# Patient Record
Sex: MALE | Race: ASIAN | NOT HISPANIC OR LATINO
[De-identification: names, ages, dates, MRNs, and addresses within clinical notes are randomized per-mention and may not be internally consistent; named-entity substitution may affect disease eponyms.]

---

## 2020-05-07 ENCOUNTER — TRANSCRIPTION ENCOUNTER (OUTPATIENT)
Age: 25
End: 2020-05-07

## 2021-04-01 ENCOUNTER — TRANSCRIPTION ENCOUNTER (OUTPATIENT)
Age: 26
End: 2021-04-01

## 2021-09-10 ENCOUNTER — TRANSCRIPTION ENCOUNTER (OUTPATIENT)
Age: 26
End: 2021-09-10

## 2021-12-20 ENCOUNTER — TRANSCRIPTION ENCOUNTER (OUTPATIENT)
Age: 26
End: 2021-12-20

## 2022-02-17 ENCOUNTER — TRANSCRIPTION ENCOUNTER (OUTPATIENT)
Age: 27
End: 2022-02-17

## 2024-06-11 ENCOUNTER — EMERGENCY (EMERGENCY)
Facility: HOSPITAL | Age: 29
LOS: 1 days | Discharge: HOME | End: 2024-06-11
Attending: STUDENT IN AN ORGANIZED HEALTH CARE EDUCATION/TRAINING PROGRAM | Admitting: EMERGENCY MEDICINE
Payer: MEDICAID

## 2024-06-11 VITALS — HEART RATE: 155 BPM | OXYGEN SATURATION: 97 %

## 2024-06-11 LAB
BASOPHILS # BLD AUTO: 0.04 K/UL — SIGNIFICANT CHANGE UP (ref 0–0.2)
BASOPHILS NFR BLD AUTO: 0.7 % — SIGNIFICANT CHANGE UP (ref 0–2)
EOSINOPHIL # BLD AUTO: 0.03 K/UL — SIGNIFICANT CHANGE UP (ref 0–0.5)
EOSINOPHIL NFR BLD AUTO: 0.5 % — SIGNIFICANT CHANGE UP (ref 0–6)
HCT VFR BLD CALC: 37.3 % — LOW (ref 39–50)
HGB BLD-MCNC: 13.2 G/DL — SIGNIFICANT CHANGE UP (ref 13–17)
IANC: 3.41 K/UL — SIGNIFICANT CHANGE UP (ref 1.8–7.4)
IMM GRANULOCYTES NFR BLD AUTO: 0.2 % — SIGNIFICANT CHANGE UP (ref 0–0.9)
LYMPHOCYTES # BLD AUTO: 1.96 K/UL — SIGNIFICANT CHANGE UP (ref 1–3.3)
LYMPHOCYTES # BLD AUTO: 32.3 % — SIGNIFICANT CHANGE UP (ref 13–44)
MCHC RBC-ENTMCNC: 30.5 PG — SIGNIFICANT CHANGE UP (ref 27–34)
MCHC RBC-ENTMCNC: 35.4 GM/DL — SIGNIFICANT CHANGE UP (ref 32–36)
MCV RBC AUTO: 86.1 FL — SIGNIFICANT CHANGE UP (ref 80–100)
MONOCYTES # BLD AUTO: 0.62 K/UL — SIGNIFICANT CHANGE UP (ref 0–0.9)
MONOCYTES NFR BLD AUTO: 10.2 % — SIGNIFICANT CHANGE UP (ref 2–14)
NEUTROPHILS # BLD AUTO: 3.41 K/UL — SIGNIFICANT CHANGE UP (ref 1.8–7.4)
NEUTROPHILS NFR BLD AUTO: 56.1 % — SIGNIFICANT CHANGE UP (ref 43–77)
NRBC # BLD: 0 /100 WBCS — SIGNIFICANT CHANGE UP (ref 0–0)
NRBC # FLD: 0 K/UL — SIGNIFICANT CHANGE UP (ref 0–0)
PLATELET # BLD AUTO: 316 K/UL — SIGNIFICANT CHANGE UP (ref 150–400)
RBC # BLD: 4.33 M/UL — SIGNIFICANT CHANGE UP (ref 4.2–5.8)
RBC # FLD: 12.2 % — SIGNIFICANT CHANGE UP (ref 10.3–14.5)
TOXICOLOGY SCREEN, DRUGS OF ABUSE, SERUM RESULT: SIGNIFICANT CHANGE UP
WBC # BLD: 6.07 K/UL — SIGNIFICANT CHANGE UP (ref 3.8–10.5)
WBC # FLD AUTO: 6.07 K/UL — SIGNIFICANT CHANGE UP (ref 3.8–10.5)

## 2024-06-11 PROCEDURE — 99285 EMERGENCY DEPT VISIT HI MDM: CPT

## 2024-06-11 RX ORDER — HALOPERIDOL DECANOATE 100 MG/ML
5 INJECTION INTRAMUSCULAR ONCE
Refills: 0 | Status: COMPLETED | OUTPATIENT
Start: 2024-06-11 | End: 2024-06-11

## 2024-06-11 NOTE — ED ADULT TRIAGE NOTE - CHIEF COMPLAINT QUOTE
Pt brought in by girlfriend c/o hallucinations and bizarre behavior. HX asthma, ETOH Abuse. Uncooperative with VS, patient pacing waiting room and hyperverbal. ;s Charge aware. Pt brought in by girlfriend and brother c/o hallucinations and bizarre behavior. HX asthma, ETOH Abuse. Uncooperative with VS, patient pacing waiting room and hyperverbal. ;s Charge aware.

## 2024-06-12 VITALS
TEMPERATURE: 98 F | RESPIRATION RATE: 16 BRPM | OXYGEN SATURATION: 100 % | SYSTOLIC BLOOD PRESSURE: 140 MMHG | HEART RATE: 90 BPM | DIASTOLIC BLOOD PRESSURE: 76 MMHG

## 2024-06-12 DIAGNOSIS — F19.929 OTHER PSYCHOACTIVE SUBSTANCE USE, UNSPECIFIED WITH INTOXICATION, UNSPECIFIED: ICD-10-CM

## 2024-06-12 LAB
ALBUMIN SERPL ELPH-MCNC: 4.6 G/DL — SIGNIFICANT CHANGE UP (ref 3.3–5)
ALP SERPL-CCNC: 58 U/L — SIGNIFICANT CHANGE UP (ref 40–120)
ALT FLD-CCNC: 33 U/L — SIGNIFICANT CHANGE UP (ref 4–41)
AMPHET UR-MCNC: POSITIVE
ANION GAP SERPL CALC-SCNC: 16 MMOL/L — HIGH (ref 7–14)
APAP SERPL-MCNC: <10 UG/ML — LOW (ref 15–25)
APPEARANCE UR: CLEAR — SIGNIFICANT CHANGE UP
AST SERPL-CCNC: 40 U/L — SIGNIFICANT CHANGE UP (ref 4–40)
BACTERIA # UR AUTO: NEGATIVE /HPF — SIGNIFICANT CHANGE UP
BARBITURATES UR SCN-MCNC: NEGATIVE — SIGNIFICANT CHANGE UP
BENZODIAZ UR-MCNC: NEGATIVE — SIGNIFICANT CHANGE UP
BILIRUB SERPL-MCNC: 0.4 MG/DL — SIGNIFICANT CHANGE UP (ref 0.2–1.2)
BILIRUB UR-MCNC: NEGATIVE — SIGNIFICANT CHANGE UP
BUN SERPL-MCNC: 13 MG/DL — SIGNIFICANT CHANGE UP (ref 7–23)
CALCIUM SERPL-MCNC: 9.2 MG/DL — SIGNIFICANT CHANGE UP (ref 8.4–10.5)
CAST: 0 /LPF — SIGNIFICANT CHANGE UP (ref 0–4)
CHLORIDE SERPL-SCNC: 101 MMOL/L — SIGNIFICANT CHANGE UP (ref 98–107)
CO2 SERPL-SCNC: 23 MMOL/L — SIGNIFICANT CHANGE UP (ref 22–31)
COCAINE METAB.OTHER UR-MCNC: NEGATIVE — SIGNIFICANT CHANGE UP
COLOR SPEC: SIGNIFICANT CHANGE UP
CREAT SERPL-MCNC: 0.9 MG/DL — SIGNIFICANT CHANGE UP (ref 0.5–1.3)
CREATININE URINE RESULT, DAU: 387 MG/DL — SIGNIFICANT CHANGE UP
DIFF PNL FLD: NEGATIVE — SIGNIFICANT CHANGE UP
EGFR: 119 ML/MIN/1.73M2 — SIGNIFICANT CHANGE UP
ETHANOL SERPL-MCNC: <10 MG/DL — SIGNIFICANT CHANGE UP
FENTANYL UR QL SCN: NEGATIVE — SIGNIFICANT CHANGE UP
GLUCOSE SERPL-MCNC: 90 MG/DL — SIGNIFICANT CHANGE UP (ref 70–99)
GLUCOSE UR QL: NEGATIVE MG/DL — SIGNIFICANT CHANGE UP
KETONES UR-MCNC: 15 MG/DL
LEUKOCYTE ESTERASE UR-ACNC: ABNORMAL
METHADONE UR-MCNC: NEGATIVE — SIGNIFICANT CHANGE UP
NITRITE UR-MCNC: NEGATIVE — SIGNIFICANT CHANGE UP
OPIATES UR-MCNC: NEGATIVE — SIGNIFICANT CHANGE UP
OXYCODONE UR-MCNC: NEGATIVE — SIGNIFICANT CHANGE UP
PCP SPEC-MCNC: SIGNIFICANT CHANGE UP
PCP UR-MCNC: NEGATIVE — SIGNIFICANT CHANGE UP
PH UR: 6 — SIGNIFICANT CHANGE UP (ref 5–8)
POTASSIUM SERPL-MCNC: 3.6 MMOL/L — SIGNIFICANT CHANGE UP (ref 3.5–5.3)
POTASSIUM SERPL-SCNC: 3.6 MMOL/L — SIGNIFICANT CHANGE UP (ref 3.5–5.3)
PROT SERPL-MCNC: 7.5 G/DL — SIGNIFICANT CHANGE UP (ref 6–8.3)
PROT UR-MCNC: SIGNIFICANT CHANGE UP MG/DL
RBC CASTS # UR COMP ASSIST: 1 /HPF — SIGNIFICANT CHANGE UP (ref 0–4)
SALICYLATES SERPL-MCNC: <0.3 MG/DL — LOW (ref 15–30)
SODIUM SERPL-SCNC: 140 MMOL/L — SIGNIFICANT CHANGE UP (ref 135–145)
SP GR SPEC: 1.03 — SIGNIFICANT CHANGE UP (ref 1–1.03)
SQUAMOUS # UR AUTO: 3 /HPF — SIGNIFICANT CHANGE UP (ref 0–5)
THC UR QL: NEGATIVE — SIGNIFICANT CHANGE UP
TSH SERPL-MCNC: 2.11 UIU/ML — SIGNIFICANT CHANGE UP (ref 0.27–4.2)
UROBILINOGEN FLD QL: 1 MG/DL — SIGNIFICANT CHANGE UP (ref 0.2–1)
WBC UR QL: 17 /HPF — HIGH (ref 0–5)

## 2024-06-12 PROCEDURE — 90792 PSYCH DIAG EVAL W/MED SRVCS: CPT

## 2024-06-12 PROCEDURE — 70450 CT HEAD/BRAIN W/O DYE: CPT | Mod: 26,MC

## 2024-06-12 RX ORDER — DIPHENHYDRAMINE HCL 50 MG
50 CAPSULE ORAL ONCE
Refills: 0 | Status: DISCONTINUED | OUTPATIENT
Start: 2024-06-12 | End: 2024-06-12

## 2024-06-12 RX ORDER — DIPHENHYDRAMINE HCL 50 MG
50 CAPSULE ORAL ONCE
Refills: 0 | Status: COMPLETED | OUTPATIENT
Start: 2024-06-12 | End: 2024-06-12

## 2024-06-12 RX ADMIN — Medication 50 MILLIGRAM(S): at 00:16

## 2024-06-12 RX ADMIN — HALOPERIDOL DECANOATE 5 MILLIGRAM(S): 100 INJECTION INTRAMUSCULAR at 00:02

## 2024-06-12 RX ADMIN — Medication 2 MILLIGRAM(S): at 00:02

## 2024-06-12 NOTE — ED PROVIDER NOTE - NSFOLLOWUPINSTRUCTIONS_ED_ALL_ED_FT
You were seen in the ER for paranoia. You were seen by our psychiatry team who advised for you to decrease your adderall usage.       . Although we are sending you home, no ER evaluation is complete without outpatient follow-up. Follow up with your regular doctor and [facility] within the next 1 week. Return to the ER for worsening symptoms, suicidal thoughts, hallucinations

## 2024-06-12 NOTE — ED BEHAVIORAL HEALTH ASSESSMENT NOTE - SUMMARY
27 y/o male, single, noncaregiver, lives with mother and brother, employed as a pharmacist, +h/o adderall use disorder, +h/o heavy alcohol use, denies h/o withdrawal, no formal psych history, no h/o psych admissions, no h/o outpatient psych treatment, no h/o self-injurious behavior or suicide attempts, no h/o violence or legal issues, PMH asthma, brought in by family and girlfriend for paranoia and agitation in context of substance use.   Patient admits to heavy Adderall use and also reports taking an unknown pill last night. Pt is currently calm, cooperative, and in good behavioral control. Pt currently denies paranoia/delusional content. He does not appear psychotic, manic, or depressed at this time. He denies SI/HI. Pt's recent paranoia/episode of agitation last night are likely secondary to substance (ie Adderall +/- other drugs) intoxication. Pt does not present an acute danger to self or others and does not meet criteria for involuntary psychiatric admission at this time. Pt declines voluntary psychiatric admission at this time.

## 2024-06-12 NOTE — ED ADULT NURSE NOTE - CHIEF COMPLAINT QUOTE
Pt brought in by girlfriend and brother c/o hallucinations and bizarre behavior. HX asthma, ETOH Abuse. Uncooperative with VS, patient pacing waiting room and hyperverbal. ;s Charge aware.

## 2024-06-12 NOTE — ED BEHAVIORAL HEALTH ASSESSMENT NOTE - REFERRAL / APPOINTMENT DETAILS
pt provided with information for substance abuse treatment; pt also provided with information for [facility]

## 2024-06-12 NOTE — ED BEHAVIORAL HEALTH NOTE - BEHAVIORAL HEALTH NOTE
Collateral from pt's girlfriend, Betzaida, at bedside: pt lives with his mother and sibling. Pt and girlfriend have been together for 2 years. States he has always been somewhat suspicious/paranoid but more so recently. He uses Adderall daily (unknown amount), which he gets from a friend. Last night, he also took an unknown pill. He has been drinking alcohol daily recently, unknown amount. He drank a lot early in the day yesterday. He also smokes cigarettes. Last night, he agreed to come to ED after he exhibited increased paranoia as well as agitation. He was closing all the blinds and tried to pull the light out of the ceiling because he thought there was a camera in there watching him. He has not been physically aggressive or threatening. No SI/HI. No formal psych history, no past psych admissions. No known h/o suicide attempts or violence. No legal history. 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 He has chronically low appetite and has poor sleep when he takes more substances than usual. PMH asthma.

## 2024-06-12 NOTE — ED PROVIDER NOTE - ATTENDING CONTRIBUTION TO CARE
Huey Jarrett DO:  patient seen and evaluated with the resident.  I was present for key portions of the History & Physical, and I agree with the Impression & Plan.

## 2024-06-12 NOTE — ED ADULT NURSE REASSESSMENT NOTE - NS ED NURSE REASSESS COMMENT FT1
Pt sleeping in bed, CO at bedside with family. No complaints at this time. Psych to see, will continue to monitor.

## 2024-06-12 NOTE — ED BEHAVIORAL HEALTH ASSESSMENT NOTE - DESCRIPTION
see HPI  Vital Signs Last 24 Hrs  T(C): 36.7 (12 Jun 2024 08:07), Max: 37.4 (11 Jun 2024 23:20)  T(F): 98.1 (12 Jun 2024 08:07), Max: 99.3 (11 Jun 2024 23:20)  HR: 81 (12 Jun 2024 08:07) (73 - 155)  BP: 126/88 (12 Jun 2024 08:07) (121/79 - 149/106)  BP(mean): --  RR: 16 (12 Jun 2024 08:07) (16 - 20)  SpO2: 100% (12 Jun 2024 08:07) (97% - 100%)    Parameters below as of 12 Jun 2024 08:07  Patient On (Oxygen Delivery Method): room air see HPI asthma

## 2024-06-12 NOTE — ED PROVIDER NOTE - CLINICAL SUMMARY MEDICAL DECISION MAKING FREE TEXT BOX
Huey Jarrett, DO: 27 yo m PW agitation.  Patient brought in by mother and family due to concerns.  Reports the patient has been abusing drugs, including Adderall, caffeine, possible alcohol.  Reports that he has been agitated, not sleeping, having hallucinations.  Has been unable to be redirected by family.  Brought him in for evaluation.  Patient unable to be evaluated upon arrival to ED.  Patient is tangential, fearful, pulled out IV.  Patient to require pharmaceutical intervention in order to assist evaluation.  Denies head trauma.  Once patient has been de-escalated, will require reevaluation and likely psychiatric evaluation.

## 2024-06-12 NOTE — ED PROVIDER NOTE - PHYSICAL EXAMINATION
General: agitated   HEENT: ncat   Neck: trachea ml  CV: well perfused   Resp: non labored breathing   Abd:  non distended   : no CVA tenderness  MSK: no deformities   Neuro: CN II-XII grossly intact, muscle strength 5/5 in all extremities,   Skin: no rashes, skin intact   psych: agitated

## 2024-06-12 NOTE — ED BEHAVIORAL HEALTH NOTE - BEHAVIORAL HEALTH NOTE
As per provider, worker met with patient’s mother Jessie ([phone]) for collateral information.     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  Pt’s mother states that the patient has presented with slight paranoia for a long time. She states that yesterday the patient had increased paranoia. She states that the patient thinks everyone is after him and want to kill him. Patient was paranoid that everyone is taking everything from him. Patient has no SA. Patient said yesterday that he tried it in the past– referring to suicidal ideation.  Patient is not connected to a psychiatrist. Patient smokes cigarettes. Patient drinks one alcoholic drink a day. Patient drank a lot yesterday. Patient has not been sleeping well. Patient’s eating has declined a bit. Pt usually eats one time a day. Patient reported that he is seeing things and said that he does not want to die. Patient stated that he is hearing things and seeing things. Pt has no access to guns. Patient has asthma. Patient is not physically violent. Patient’s mother has depression, and his younger brother has autism. Patient was closing all the windows in the home yesterday and said that people can hear him and come and kill him. Mother states she has not seen him this paranoid.

## 2024-06-12 NOTE — ED BEHAVIORAL HEALTH NOTE - BEHAVIORAL HEALTH NOTE
Pt unable to participate in interview at this time due to sedation. Will reassess when awake and alert.

## 2024-06-12 NOTE — ED ADULT NURSE NOTE - NS_BH TRG QUESTION4_ED_ALL_ED
No Anesthesia Type: 1% lidocaine with 1:100,000 epinephrine and 408mcg clindamycin/ml and a 1:3 solution of 8.4% sodium bicarbonate

## 2024-06-12 NOTE — ED BEHAVIORAL HEALTH ASSESSMENT NOTE - HPI (INCLUDE ILLNESS QUALITY, SEVERITY, DURATION, TIMING, CONTEXT, MODIFYING FACTORS, ASSOCIATED SIGNS AND SYMPTOMS)
27 y/o male, single, noncaregiver, lives with mother and brother, employed as a pharmacist, +h/o adderall use disorder, +h/o heavy alcohol use, denies h/o withdrawal, no formal psych history, no h/o psych admissions, no h/o outpatient psych treatment, no h/o self-injurious behavior or suicide attempts, no h/o violence or legal issues, PMH asthma, brought in by family and girlfriend for paranoia and agitation.     On arrival to ED, pt was reportedly agitated and HR was in the 150's. He received Haldol 5 mg, Ativan 2 mg, Benadryl 50 mg IM x 1. He slept and was reassessed once awake this AM.   On interview with writer, pt is drowsy but able to engage in interview. States he is in the ED because "I use drugs a lot." He reports getting Adderall (30 mg pills) from a friend. States he takes "a lot" of Adderall daily for "awhile" now. In addition to heavy Adderall use, pt states he took an unknown "pink pill" last night. States he remembers feeling like people were watching him last night. He reports frequent alcohol use (BAL <10), states last use was yesterday (6/11/24) morning. He denies other substance use. Pt states he likes taking Adderall because it helps him focus and stay awake. He acknowledges that he needs treatment for his substance use. Pt denies current paranoia. No delusional content elicited at this time. He denies AH/VH. States he sleeps ok and doesn't feel hyper when not using Adderall. He denies persistently depressed mood or anhedonia. He denies SI/HI. He denies taking any prescribed medications at this time.   Collateral from pt's girlfriend, Betzaida, at bedside: pt lives with his mother and sibling. Pt and girlfriend have been together for 2 years. States he has always been somewhat suspicious/paranoid but more so recently. He uses Adderall daily (unknown amount), which he gets from a friend. Last night, he also took an unknown pill. He has been drinking alcohol daily recently, unknown amount. He drank a lot early in the day yesterday. He also smokes cigarettes. Last night, he agreed to come to ED after he exhibited increased paranoia as well as agitation. He was closing all the blinds and tried to pull the light out of the ceiling because he thought there was a camera in there watching him. He has not been physically aggressive or threatening. No SI/HI. No formal psych history, no past psych admissions. No known h/o suicide attempts or violence. No legal history. 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 He has chronically low appetite and has poor sleep when he takes more substances than usual. PMH asthma.

## 2024-06-12 NOTE — ED ADULT NURSE NOTE - OBJECTIVE STATEMENT
Pt received to room 3 a/o x 3 c/o drug use, auditory and visual hallucinations, bizarre behavior, SI with no active plan. Pt noted to be hyperverbal and anxious. Pt states he took all the drugs and drank. Unable to say what he took. per family pt took unknown amount Vyvanse and Adderall and drank alcohol. Respirations even and unlabored. Lung sounds clear with equal chest rise bilaterally. ABD is soft, non tender, non distended with normal active bowel sounds No complaints of chest pain, headache, nausea, dizziness, vomiting  SOB, fever, chills verbalized. Pt denies  HI.   20g iv placed to right ac labs drawn and sent. Pt medicated as  per order.

## 2024-06-12 NOTE — ED PROVIDER NOTE - CARE PLAN
Principal Discharge DX:	Agitation  Secondary Diagnosis:	Substance intoxication with complication   1

## 2024-06-12 NOTE — ED ADULT NURSE NOTE - NSFALLHARMRISKINTERV_ED_ALL_ED

## 2024-06-12 NOTE — ED ADULT NURSE REASSESSMENT NOTE - NS ED NURSE REASSESS COMMENT FT1
Break RN: Pt received in stretcher in room 3. Pt sleeping, was sedated, wakes up to painful stimuli, VSS, NSR on monitor. sating well on room air. PCA at bedside for constant observation. S Break RN: Pt received in stretcher in room 3. Pt sleeping, was sedated, wakes up to painful stimuli, VSS, NSR on monitor. sating well on room air. PCA at bedside for constant observation.

## 2024-06-12 NOTE — ED PROVIDER NOTE - PROGRESS NOTE DETAILS
Gregorio BOYCE:  Pt signed out to me.  Labs and imaging reviewed, no acute findings.  Pt is medically cleared for psychiatric evaluation.  Pt received IM meds for agitation prior to my arrival, has been sleeping comfortably all night.  Psych consulted, pending pt waking for evaluation. Gregorio BOYCE:  Pt signed out to me by Dr Jarrett.  Labs and imaging reviewed, no acute findings.  Pt is medically cleared for psychiatric evaluation.  Pt received IM meds for agitation prior to my arrival, has been sleeping comfortably all night.  Psych consulted, pending pt waking for evaluation. Received patient on signout from Dr. Perkins pending psychiatry evaluation.  Evaluate psychiatry spoke to the psychiatry attending who states patient is cleared for discharge.  No safety concerns.  Will documents.  Patient with father at bedside has been calm during my shift.  Will DC the one-to-one as requested

## 2024-06-12 NOTE — ED BEHAVIORAL HEALTH ASSESSMENT NOTE - DETAILS
alcoholism in family per patient's girlfriend informed mother and girlfriend no SI deferred in ED setting denies

## 2024-06-12 NOTE — ED BEHAVIORAL HEALTH ASSESSMENT NOTE - RISK ASSESSMENT
Risk factors include substance abuse, paranoia/impulsivity in context of substance intoxication, not in treatment.  Protective factors include no suicide attempts, no violence history, no access to guns, supportive family, willingness to seek help, no suicidal ideation or homicidal ideation, identifies reasons for living.  Pt is not at acutely elevated risk of harm to self or others.

## 2024-06-12 NOTE — ED PROVIDER NOTE - PATIENT PORTAL LINK FT
You can access the FollowMyHealth Patient Portal offered by [facility] by registering at the following website: [email or web address]. By joining [facility]’s FollowMyHealth portal, you will also be able to view your health information using other applications (apps) compatible with our system.